# Patient Record
Sex: FEMALE | Race: BLACK OR AFRICAN AMERICAN | NOT HISPANIC OR LATINO | URBAN - METROPOLITAN AREA
[De-identification: names, ages, dates, MRNs, and addresses within clinical notes are randomized per-mention and may not be internally consistent; named-entity substitution may affect disease eponyms.]

---

## 2018-02-02 ENCOUNTER — IMPORTED ENCOUNTER (OUTPATIENT)
Dept: URBAN - METROPOLITAN AREA CLINIC 31 | Facility: CLINIC | Age: 53
End: 2018-02-02

## 2018-02-02 PROBLEM — E11.3293: Noted: 2018-02-02

## 2018-02-02 PROBLEM — R51: Noted: 2018-02-02

## 2018-02-02 PROCEDURE — 92015 DETERMINE REFRACTIVE STATE: CPT

## 2018-02-02 PROCEDURE — 92004 COMPRE OPH EXAM NEW PT 1/>: CPT

## 2018-02-02 PROCEDURE — 92250 FUNDUS PHOTOGRAPHY W/I&R: CPT

## 2018-02-02 NOTE — PATIENT DISCUSSION
1. 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 Stressed the importance of regular followup and good control of BS BP and Lipids to avoid future complications. 2. Refractive error - Change glasses. 3.  Headache of non-ocular origin - Patient has headache that cannot be explained by ocular exam and testing. Continue to follow with managing medical practitioner. 4. Return for an appointment in 1 year for comprehensive exam. with Dr. Irvin Gonzales.

## 2019-02-04 ENCOUNTER — IMPORTED ENCOUNTER (OUTPATIENT)
Dept: URBAN - METROPOLITAN AREA CLINIC 31 | Facility: CLINIC | Age: 54
End: 2019-02-04

## 2019-02-04 PROBLEM — R51: Noted: 2019-02-04

## 2019-02-04 PROBLEM — H04.123: Noted: 2019-02-04

## 2019-02-04 PROCEDURE — 92014 COMPRE OPH EXAM EST PT 1/>: CPT

## 2019-02-04 PROCEDURE — 92134 CPTRZ OPH DX IMG PST SGM RTA: CPT

## 2019-02-04 NOTE — PATIENT DISCUSSION
1.  Dry Eyes OU:  Start artificials tears. Encouraged regular use. 2.  Headache of non-ocular origin - Patient has headache that cannot be explained by ocular exam and testing. Continue to follow with managing medical practitioner. 3. Refractive error - Glasses change optional. 4.  Return for an appointment in 1 month for office call. VF 10-2. with Dr. Baljinder Malcolm.

## 2022-04-02 ASSESSMENT — TONOMETRY
OD_IOP_MMHG: 17
OS_IOP_MMHG: 15
OD_IOP_MMHG: 15
OS_IOP_MMHG: 17

## 2022-04-02 ASSESSMENT — VISUAL ACUITY
OS_SC: 20/20
OD_SC: 20/20
OS_SC: 20/20-2
OD_SC: 20/20

## 2022-07-09 ENCOUNTER — TELEPHONE ENCOUNTER (OUTPATIENT)
Dept: URBAN - METROPOLITAN AREA CLINIC 121 | Facility: CLINIC | Age: 57
End: 2022-07-09

## 2022-07-09 RX ORDER — HYDRALAZINE HYDROCHLORIDE 50 MG/1
TABLET ORAL
Refills: 2 | OUTPATIENT
Start: 2016-05-06 | End: 2016-06-10

## 2022-07-09 RX ORDER — FEXOFENADINE HCL 180 MG/1
TABLET ORAL ONCE A DAY
Refills: 2 | OUTPATIENT
Start: 2016-05-06 | End: 2016-06-10

## 2022-07-09 RX ORDER — HYDROCHLOROTHIAZIDE 25 MG/1
TABLET ORAL ONCE A DAY
Refills: 2 | OUTPATIENT
Start: 2016-05-06 | End: 2016-06-10

## 2022-07-09 RX ORDER — TRAZODONE HYDROCHLORIDE 100 MG/1
TABLET ORAL
Refills: 2 | OUTPATIENT
Start: 2016-05-06 | End: 2016-06-10

## 2022-07-09 RX ORDER — DICLOFENAC SODIUM 1 %
GEL (GRAM) TOPICAL
Refills: 2 | OUTPATIENT
Start: 2016-05-06 | End: 2016-06-10

## 2022-07-09 RX ORDER — GLIPIZIDE 10 MG/1
TABLET, FILM COATED, EXTENDED RELEASE ORAL ONCE A DAY
Refills: 3 | OUTPATIENT
Start: 2016-05-06 | End: 2016-06-10

## 2022-07-09 RX ORDER — OXYBUTYNIN CHLORIDE 5 MG/1
TABLET ORAL TWICE A DAY
Refills: 2 | OUTPATIENT
Start: 2016-05-06 | End: 2016-06-10

## 2022-07-09 RX ORDER — FLUTICASONE PROPIONATE 50 UG/1
SPRAY, METERED NASAL
Refills: 2 | OUTPATIENT
Start: 2016-05-06 | End: 2016-06-10

## 2022-07-09 RX ORDER — LOSARTAN POTASSIUM 25 MG/1
TABLET, FILM COATED ORAL
Refills: 2 | OUTPATIENT
Start: 2016-05-06 | End: 2016-06-10

## 2022-07-09 RX ORDER — DILTIAZEM HYDROCHLORIDE 300 MG/1
CAPSULE, EXTENDED RELEASE ORAL ONCE A DAY
Refills: 2 | OUTPATIENT
Start: 2016-05-06 | End: 2016-06-10

## 2022-07-09 RX ORDER — OMEPRAZOLE 40 MG/1
CAPSULE, DELAYED RELEASE ORAL ONCE A DAY
Refills: 2 | OUTPATIENT
Start: 2016-05-06 | End: 2016-06-10

## 2022-07-10 ENCOUNTER — TELEPHONE ENCOUNTER (OUTPATIENT)
Dept: URBAN - METROPOLITAN AREA CLINIC 121 | Facility: CLINIC | Age: 57
End: 2022-07-10

## 2022-07-10 RX ORDER — GLIPIZIDE 10 MG/1
TABLET, FILM COATED, EXTENDED RELEASE ORAL ONCE A DAY
Refills: 3 | Status: ACTIVE | COMMUNITY
Start: 2016-06-10

## 2022-07-10 RX ORDER — HYDROCHLOROTHIAZIDE 25 MG/1
TABLET ORAL ONCE A DAY
Refills: 2 | Status: ACTIVE | COMMUNITY
Start: 2016-06-10

## 2022-07-10 RX ORDER — FEXOFENADINE HCL 180 MG/1
TABLET ORAL ONCE A DAY
Refills: 2 | Status: ACTIVE | COMMUNITY
Start: 2016-06-10

## 2022-07-10 RX ORDER — LOSARTAN POTASSIUM 25 MG/1
TABLET, FILM COATED ORAL
Refills: 2 | Status: ACTIVE | COMMUNITY
Start: 2016-06-10

## 2022-07-10 RX ORDER — TRAZODONE HYDROCHLORIDE 100 MG/1
TABLET ORAL
Refills: 2 | Status: ACTIVE | COMMUNITY
Start: 2016-06-10

## 2022-07-10 RX ORDER — FLUTICASONE PROPIONATE 50 UG/1
SPRAY, METERED NASAL
Refills: 2 | Status: ACTIVE | COMMUNITY
Start: 2016-06-10

## 2022-07-10 RX ORDER — OXYBUTYNIN CHLORIDE 5 MG/1
TABLET ORAL TWICE A DAY
Refills: 2 | Status: ACTIVE | COMMUNITY
Start: 2016-06-10

## 2022-07-10 RX ORDER — DICLOFENAC SODIUM 1 %
GEL (GRAM) TOPICAL
Refills: 2 | Status: ACTIVE | COMMUNITY
Start: 2016-06-10

## 2022-07-10 RX ORDER — OMEPRAZOLE 40 MG/1
CAPSULE, DELAYED RELEASE ORAL ONCE A DAY
Refills: 2 | Status: ACTIVE | COMMUNITY
Start: 2016-06-10

## 2022-07-10 RX ORDER — DILTIAZEM HYDROCHLORIDE 300 MG/1
CAPSULE, EXTENDED RELEASE ORAL ONCE A DAY
Refills: 2 | Status: ACTIVE | COMMUNITY
Start: 2016-06-10

## 2022-07-10 RX ORDER — HYDRALAZINE HYDROCHLORIDE 50 MG/1
TABLET ORAL
Refills: 2 | Status: ACTIVE | COMMUNITY
Start: 2016-06-10

## 2022-07-30 ENCOUNTER — TELEPHONE ENCOUNTER (OUTPATIENT)
Age: 57
End: 2022-07-30

## 2022-07-31 ENCOUNTER — TELEPHONE ENCOUNTER (OUTPATIENT)
Age: 57
End: 2022-07-31